# Patient Record
Sex: FEMALE
[De-identification: names, ages, dates, MRNs, and addresses within clinical notes are randomized per-mention and may not be internally consistent; named-entity substitution may affect disease eponyms.]

---

## 2023-08-30 PROBLEM — Z00.00 ENCOUNTER FOR PREVENTIVE HEALTH EXAMINATION: Status: ACTIVE | Noted: 2023-08-30

## 2023-10-02 PROBLEM — Z87.39 HISTORY OF OSTEOPOROSIS: Status: RESOLVED | Noted: 2023-10-02 | Resolved: 2023-10-02

## 2023-10-02 PROBLEM — C79.00: Status: ACTIVE | Noted: 2023-10-02

## 2023-10-02 PROBLEM — Z86.39 HISTORY OF HYPOTHYROIDISM: Status: RESOLVED | Noted: 2023-10-02 | Resolved: 2023-10-02

## 2023-10-02 RX ORDER — LEVOTHYROXINE SODIUM 0.05 MG/1
50 TABLET ORAL
Refills: 0 | Status: ACTIVE | COMMUNITY

## 2023-10-03 ENCOUNTER — APPOINTMENT (OUTPATIENT)
Dept: HEMATOLOGY ONCOLOGY | Facility: CLINIC | Age: 63
End: 2023-10-03
Payer: COMMERCIAL

## 2023-10-03 VITALS
SYSTOLIC BLOOD PRESSURE: 134 MMHG | DIASTOLIC BLOOD PRESSURE: 87 MMHG | BODY MASS INDEX: 20.24 KG/M2 | RESPIRATION RATE: 18 BRPM | OXYGEN SATURATION: 100 % | WEIGHT: 110 LBS | TEMPERATURE: 97.1 F | HEIGHT: 62 IN | HEART RATE: 88 BPM

## 2023-10-03 DIAGNOSIS — C79.00: ICD-10-CM

## 2023-10-03 DIAGNOSIS — Z86.39 PERSONAL HISTORY OF OTHER ENDOCRINE, NUTRITIONAL AND METABOLIC DISEASE: ICD-10-CM

## 2023-10-03 DIAGNOSIS — Z87.39 PERSONAL HISTORY OF OTHER DISEASES OF THE MUSCULOSKELETAL SYSTEM AND CONNECTIVE TISSUE: ICD-10-CM

## 2023-10-03 PROCEDURE — 99203 OFFICE O/P NEW LOW 30 MIN: CPT

## 2023-10-23 ENCOUNTER — TRANSCRIPTION ENCOUNTER (OUTPATIENT)
Age: 63
End: 2023-10-23

## 2023-12-12 ENCOUNTER — OUTPATIENT (OUTPATIENT)
Dept: OUTPATIENT SERVICES | Facility: HOSPITAL | Age: 63
LOS: 1 days | Discharge: HOME | End: 2023-12-12

## 2023-12-12 DIAGNOSIS — C69.32 MALIGNANT NEOPLASM OF LEFT CHOROID: ICD-10-CM

## 2023-12-12 DIAGNOSIS — C79.00 SECONDARY MALIGNANT NEOPLASM OF UNSPECIFIED KIDNEY AND RENAL PELVIS: ICD-10-CM

## 2023-12-18 ENCOUNTER — APPOINTMENT (OUTPATIENT)
Dept: MRI IMAGING | Facility: CLINIC | Age: 63
End: 2023-12-18
Payer: COMMERCIAL

## 2023-12-18 ENCOUNTER — APPOINTMENT (OUTPATIENT)
Dept: CT IMAGING | Facility: CLINIC | Age: 63
End: 2023-12-18
Payer: COMMERCIAL

## 2023-12-18 ENCOUNTER — OUTPATIENT (OUTPATIENT)
Dept: OUTPATIENT SERVICES | Facility: HOSPITAL | Age: 63
LOS: 1 days | End: 2023-12-18

## 2023-12-18 PROCEDURE — 71260 CT THORAX DX C+: CPT | Mod: 26

## 2023-12-18 PROCEDURE — 74183 MRI ABD W/O CNTR FLWD CNTR: CPT | Mod: 26

## 2023-12-18 PROCEDURE — 72197 MRI PELVIS W/O & W/DYE: CPT | Mod: 26

## 2023-12-21 ENCOUNTER — APPOINTMENT (OUTPATIENT)
Dept: HEMATOLOGY ONCOLOGY | Facility: CLINIC | Age: 63
End: 2023-12-21
Payer: COMMERCIAL

## 2023-12-21 PROCEDURE — 99214 OFFICE O/P EST MOD 30 MIN: CPT | Mod: 95

## 2023-12-21 NOTE — HISTORY OF PRESENT ILLNESS
[Home] : at home, [unfilled] , at the time of the visit. 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 2/9/23 renal cryoablation 8/10/23 renal microwave ablation  INTERVAL HISTORY: Karen Cortez is a 63 year old female with a history of choroidal melanoma of the left eye diagnosed in 2000 who underwent cryoablation on 02/09/2023 and then microablation for local recurrence of a solitary renal metastasis on 08/10/2023, and who returns for continued expectant observation.  Please see my prior note for her complex history.  She had been followed by Dr. Clark at [facility] but is now being managed by Dr. Forest Lutz at [facility].  Briefly, she was initially evaluated by her ophthalmologist in 2000 after developing a scratch in her left eye.  She was initially diagnosed with osteoma.  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  She believes that the initial tumor size was 14 mm and involved the optic nerve.  She was initially advised that she required enucleation given the size and location of the tumor, and sought multiple opinions, including Dr. Carrero at [facility] and Dr. Verdin at [facility].  She also saw Dr. Clark at [facility] and ultimately underwent proton beam therapy to the left eye in September 2000.  No biopsy was performed at that time (no cytogenetic of [facility] available).  An initial CT of the chest/abdomen/pelvis at that time was without evidence of metastatic disease.  She has since been monitored with at least annual eye exams with Dr. Clark since then.  She had also been under the care of Dr. Derek Grant for medical oncology surveillance.  She reports about q6 months CT scans of the chest/abdomen/pelvis for the first 5 years, followed by annual imaging until 2009.  She then established care at [facility] in June 2017.  MRI of the abdomen and pelvis on 9/28/17 demonstrated lesions within the right femoral neck, left femoral head, and right iliac crest, but follow up PETCT on 10/6/2017 did not show any FDG uptake.  Moreover, comparison of the PET/CT with prior outside CT scans from 2009 showed that the lesions were unchanged and appeared benign. She underwent an abdominal MRI on 12/4/21.  This study, when compared with a prior dated 8/5/20 demonstrated no significant changes in the liver, but the presence of a new 1 mm medial right upper pole renal lesion.  She saw a number of urologists at [facility] and elsewhere to explore management options.  She underwent repeat MRI abdomen on 11/28/22 which on outside report showed a newly visualized 1.1 cm lesion in the medial segment of the left lobe of the liver and interval increase in size of a mildly enhancing lesion in the posteromedial midpole of the right kidney.  Her scans were reviewed by [facility] radiologists who could not idenity the reported lesion within the liver. She underwent biopsy of the kdiney lesion on 1/6/23.  This demonstrated metastatic melanoma.  Molecular testing revealed GNAQ Q209L mutation.  After multidisclipinary discussion, she underwent cryoablation of this lesion on 2/9/23.  She underwent repeat imaging on 6/10/23 which demonstrated 1 cm area of enhancement adjacent to area of prior ablation, concerning for growing residual disease.  There was no other evidence of other metastases.  She underwent microwave ablation of this lesion on 8/10/23.  She has been on expectant observation since that time.  Clinically, the patient reports doing well.  She underwent a chest CT and an abdominal and pelvic MRI on 12/18/2023 which were negative for overt evidence of recurrent disease.

## 2023-12-21 NOTE — ASSESSMENT
[FreeTextEntry1] : Mrs. 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 Clark in September 2000 (no cytogenetic or GEP testing performed). She did well until she was diagnosed with biopsy proven solitary site of recurrence in the kidney on 01/06/2023. She underwent cryoablation of this lesion on 02/09/2023 and then microablation for local recurrence on 08/10/2023.  Based upon history, examination and review of imaging studies, she appears to be doing well without overt evidence of disease progression. At this time, we will continue her on close expectant observation. We will see her back in 2 to 3 months with full surveillance imaging studies. She knows to contact us should she have any questions or concerns in the interim.

## 2024-02-20 ENCOUNTER — RESULT REVIEW (OUTPATIENT)
Age: 64
End: 2024-02-20

## 2024-03-04 ENCOUNTER — APPOINTMENT (OUTPATIENT)
Dept: MRI IMAGING | Facility: CLINIC | Age: 64
End: 2024-03-04
Payer: COMMERCIAL

## 2024-03-04 ENCOUNTER — APPOINTMENT (OUTPATIENT)
Dept: CT IMAGING | Facility: CLINIC | Age: 64
End: 2024-03-04
Payer: COMMERCIAL

## 2024-03-04 ENCOUNTER — OUTPATIENT (OUTPATIENT)
Dept: OUTPATIENT SERVICES | Facility: HOSPITAL | Age: 64
LOS: 1 days | End: 2024-03-04

## 2024-03-04 PROCEDURE — 72197 MRI PELVIS W/O & W/DYE: CPT | Mod: 26

## 2024-03-04 PROCEDURE — 71260 CT THORAX DX C+: CPT | Mod: 26

## 2024-03-04 PROCEDURE — 74183 MRI ABD W/O CNTR FLWD CNTR: CPT | Mod: 26

## 2024-03-19 ENCOUNTER — APPOINTMENT (OUTPATIENT)
Dept: HEMATOLOGY ONCOLOGY | Facility: CLINIC | Age: 64
End: 2024-03-19
Payer: COMMERCIAL

## 2024-03-19 PROCEDURE — 99214 OFFICE O/P EST MOD 30 MIN: CPT

## 2024-03-19 PROCEDURE — G2211 COMPLEX E/M VISIT ADD ON: CPT

## 2024-03-19 NOTE — HISTORY OF PRESENT ILLNESS
[Home] : at home, [unfilled] , at the time of the visit. 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 2/9/23 renal cryoablation 8/10/23 renal microwave ablation  INTERVAL HISTORY: Karen Cortez is a 64 year old female with a history of choroidal melanoma of the left eye diagnosed in 2000 who underwent cryoablation on 02/09/2023 and then microablation for local recurrence of a solitary renal metastasis on 08/10/2023, and who returns for continued expectant observation.  Please see my prior note for her complex history.  She had been followed by Dr. Clark at [facility] but is now being managed by Dr. Forest Lutz at [facility].  Briefly, she was initially evaluated by her ophthalmologist in 2000 after developing a scratch in her left eye.  She was initially diagnosed with osteoma.  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  She believes that the initial tumor size was 14 mm and involved the optic nerve.  She was initially advised that she required enucleation given the size and location of the tumor, and sought multiple opinions, including Dr. Carrero at [facility] and Dr. Verdin at [facility].  She also saw Dr. Clark at [facility] and ultimately underwent proton beam therapy to the left eye in September 2000.  No biopsy was performed at that time (no cytogenetic of [facility] available).  An initial CT of the chest/abdomen/pelvis at that time was without evidence of metastatic disease.  She has since been monitored with at least annual eye exams with Dr. Clark since then.  She had also been under the care of Dr. Derek Grant for medical oncology surveillance.  She reports about q6 months CT scans of the chest/abdomen/pelvis for the first 5 years, followed by annual imaging until 2009.  She then established care at [facility] in June 2017.  MRI of the abdomen and pelvis on 9/28/17 demonstrated lesions within the right femoral neck, left femoral head, and right iliac crest, but follow up PETCT on 10/6/2017 did not show any FDG uptake.  Moreover, comparison of the PET/CT with prior outside CT scans from 2009 showed that the lesions were unchanged and appeared benign. She underwent an abdominal MRI on 12/4/21.  This study, when compared with a prior dated 8/5/20 demonstrated no significant changes in the liver, but the presence of a new 1 mm medial right upper pole renal lesion.  She saw a number of urologists at [facility] and elsewhere to explore management options.  She underwent repeat MRI abdomen on 11/28/22 which on outside report showed a newly visualized 1.1 cm lesion in the medial segment of the left lobe of the liver and interval increase in size of a mildly enhancing lesion in the posteromedial midpole of the right kidney.  Her scans were reviewed by [facility] radiologists who could not idenity the reported lesion within the liver. She underwent biopsy of the kidney lesion on 1/6/23.  This demonstrated metastatic melanoma.  Molecular testing revealed GNAQ Q209L mutation.  After multidisclipinary discussion, she underwent cryoablation of this lesion on 2/9/23.  She underwent repeat imaging on 6/10/23 which demonstrated 1 cm area of enhancement adjacent to area of prior ablation, concerning for growing residual disease.  There was no other evidence of other metastases.  She underwent microwave ablation of this lesion on 8/10/23.  She has been on expectant observation since that time.  Clinically, the patient reports doing well.  She is enjoying working at [employer].  She underwent a chest CT and an abdominal and pelvic MRI on 03/04/2024 which were negative for overt evidence of recurrent disease.

## 2024-03-19 NOTE — ASSESSMENT
[FreeTextEntry1] : Mrs. 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 Clark in September 2000 (no cytogenetic or GEP testing performed). She did well until she was diagnosed with biopsy proven solitary site of recurrence in the kidney on 01/06/2023. She underwent cryoablation of this lesion on 02/09/2023 and then microablation for local recurrence on 08/10/2023.  Based upon history, examination and review of imaging studies, she appears to be doing well without overt evidence of disease progression. At this time, we will continue her on close expectant observation. We will see her back in 3 months with full surveillance imaging studies. She knows to contact us should she have any questions or concerns in the interim.

## 2024-03-19 NOTE — REASON FOR VISIT
[Home] : at home, [unfilled] , at the time of the visit. [Medical Office: ([facility])___] : at the medical office located in  [Patient] : the patient [Follow-Up Visit] : a follow-up

## 2024-06-03 ENCOUNTER — APPOINTMENT (OUTPATIENT)
Dept: MRI IMAGING | Facility: CLINIC | Age: 64
End: 2024-06-03
Payer: COMMERCIAL

## 2024-06-03 ENCOUNTER — APPOINTMENT (OUTPATIENT)
Dept: CT IMAGING | Facility: CLINIC | Age: 64
End: 2024-06-03
Payer: COMMERCIAL

## 2024-06-03 ENCOUNTER — OUTPATIENT (OUTPATIENT)
Dept: OUTPATIENT SERVICES | Facility: HOSPITAL | Age: 64
LOS: 1 days | End: 2024-06-03

## 2024-06-03 PROCEDURE — 71260 CT THORAX DX C+: CPT | Mod: 26

## 2024-06-03 PROCEDURE — 74183 MRI ABD W/O CNTR FLWD CNTR: CPT | Mod: 26

## 2024-06-10 PROBLEM — C69.32: Status: ACTIVE | Noted: 2023-10-02

## 2024-06-10 NOTE — REASON FOR VISIT
[Follow-Up Visit] : a follow-up visit for [Home] : at home, [unfilled] , at the time of the visit. [Medical Office: ([facility])___] : at the medical office located in  [Patient] : the patient

## 2024-06-10 NOTE — REASON FOR VISIT
[Follow-Up Visit] : a follow-up visit for [Home] : at home, [unfilled] , at the time of the visit. [Medical Office: ([facility])___] : at the medical office located in  [Patient] : the patient

## 2024-06-11 ENCOUNTER — APPOINTMENT (OUTPATIENT)
Dept: HEMATOLOGY ONCOLOGY | Facility: CLINIC | Age: 64
End: 2024-06-11
Payer: COMMERCIAL

## 2024-06-11 DIAGNOSIS — C69.32 MALIGNANT NEOPLASM OF LEFT CHOROID: ICD-10-CM

## 2024-06-11 PROCEDURE — 99214 OFFICE O/P EST MOD 30 MIN: CPT

## 2024-06-11 PROCEDURE — G2211 COMPLEX E/M VISIT ADD ON: CPT | Mod: NC,1L

## 2024-06-11 NOTE — ASSESSMENT
[FreeTextEntry1] : Mrs. Cortez is a 64 -year -old female with a history of a uveal melanoma arising from the left eye diagnosed in 2000, status post proton beam therapy with Dr. Clark in September 2000 (no cytogenetic or GEP testing performed). She did well until she was diagnosed with biopsy proven solitary site of recurrence in the kidney on 01/06/2023. She underwent cryoablation of this lesion on 02/09/2023 and then microablation for local recurrence on 08/10/2023.  Based upon history, examination and review of imaging studies, she appears to be doing well without overt evidence of disease progression. At this time, we will continue her on close expectant observation.  We will see her back in 3 months with full surveillance imaging studies. She knows to contact us should she have any questions or concerns in the interim.

## 2024-06-11 NOTE — HISTORY OF PRESENT ILLNESS
[Home] : at home, [unfilled] , at the time of the visit. [Medical Office: ([facility])___] : at the medical office located in  [Verbal consent obtained from patient] : the patient, [unfilled] [de-identified] : Patient is here today for a follow up visit. Since the last visit, the following has occurred: - 6/3/2024: CT Chest - No suspicious pulmonary finding.  - 6/3/2024: MR Abdomen - No evidence of metastatic disease within the abdomen.

## 2024-06-11 NOTE — PHYSICAL EXAM
[Fully active, able to carry on all pre-disease performance without restriction] : Status 0 - Fully active, able to carry on all pre-disease performance without restriction [Normal] : well developed, well nourished, in no acute distress [de-identified] : No increased work of breathing. [de-identified] : Alert and Oriented x 3

## 2024-06-11 NOTE — HISTORY OF PRESENT ILLNESS
[Home] : at home, [unfilled] , at the time of the visit. [Medical Office: ([facility])___] : at the medical office located in  [Verbal consent obtained from patient] : the patient, [unfilled] [de-identified] : Patient is here today for a follow up visit. Since the last visit, the following has occurred: - 6/3/2024: CT Chest - No suspicious pulmonary finding.  - 6/3/2024: MR Abdomen - No evidence of metastatic disease within the abdomen.

## 2024-06-11 NOTE — PHYSICAL EXAM
[Fully active, able to carry on all pre-disease performance without restriction] : Status 0 - Fully active, able to carry on all pre-disease performance without restriction [Normal] : well developed, well nourished, in no acute distress [de-identified] : No increased work of breathing. [de-identified] : Alert and Oriented x 3

## 2024-09-16 ENCOUNTER — APPOINTMENT (OUTPATIENT)
Dept: MRI IMAGING | Facility: CLINIC | Age: 64
End: 2024-09-16
Payer: COMMERCIAL

## 2024-09-16 ENCOUNTER — OUTPATIENT (OUTPATIENT)
Dept: OUTPATIENT SERVICES | Facility: HOSPITAL | Age: 64
LOS: 1 days | End: 2024-09-16

## 2024-09-16 ENCOUNTER — APPOINTMENT (OUTPATIENT)
Dept: CT IMAGING | Facility: CLINIC | Age: 64
End: 2024-09-16
Payer: COMMERCIAL

## 2024-09-16 PROCEDURE — 71260 CT THORAX DX C+: CPT | Mod: 26

## 2024-09-16 PROCEDURE — 74183 MRI ABD W/O CNTR FLWD CNTR: CPT | Mod: 26

## 2024-09-16 PROCEDURE — 72197 MRI PELVIS W/O & W/DYE: CPT | Mod: 26

## 2024-09-17 ENCOUNTER — NON-APPOINTMENT (OUTPATIENT)
Age: 64
End: 2024-09-17

## 2024-09-18 ENCOUNTER — NON-APPOINTMENT (OUTPATIENT)
Age: 64
End: 2024-09-18

## 2024-09-23 NOTE — REASON FOR VISIT
[Follow-Up Visit] : a follow-up visit for [Home] : at home, [unfilled] , at the time of the visit. [Medical Office: ([facility])___] : at the medical office located in  [Patient] : the patient

## 2024-09-23 NOTE — REASON FOR VISIT
[Follow-Up Visit] : a follow-up visit for [Home] : at home, [unfilled] , at the time of the visit. [Medical Office: ([facility])___] : at the medical office located in  [Patient] : the patient

## 2024-09-24 ENCOUNTER — APPOINTMENT (OUTPATIENT)
Dept: HEMATOLOGY ONCOLOGY | Facility: CLINIC | Age: 64
End: 2024-09-24
Payer: COMMERCIAL

## 2024-09-24 ENCOUNTER — NON-APPOINTMENT (OUTPATIENT)
Age: 64
End: 2024-09-24

## 2024-09-24 DIAGNOSIS — C69.32 MALIGNANT NEOPLASM OF LEFT CHOROID: ICD-10-CM

## 2024-09-24 PROCEDURE — 99214 OFFICE O/P EST MOD 30 MIN: CPT | Mod: 95

## 2024-09-24 PROCEDURE — G2211 COMPLEX E/M VISIT ADD ON: CPT | Mod: NC

## 2024-09-24 NOTE — ASSESSMENT
[FreeTextEntry1] : Mrs. Cortez is a 64 -year -old female with a history of a uveal melanoma arising from the left eye diagnosed in 2000, status post proton beam therapy with Dr. Clark in September 2000 (no cytogenetic or GEP testing performed). She did well until she was diagnosed with biopsy proven solitary site of recurrence in the kidney on 01/06/2023. She underwent cryoablation of this lesion on 02/09/2023 and then microablation for local recurrence on 08/10/2023.  Based upon history, examination and review of imaging studies, she appears to be doing well without overt evidence of disease progression. At this time, we will continue her on close expectant observation.  We will see her back in 4 months with full surveillance imaging studies. She knows to contact us should she have any questions or concerns in the interim. She is planning to travel to [facility] as well as [place] with a richelle.

## 2024-09-24 NOTE — ASSESSMENT
[FreeTextEntry1] : Mrs. Cortez is a 64 -year -old female with a history of a uveal melanoma arising from the left eye diagnosed in 2000, status post proton beam therapy with Dr. Clark in September 2000 (no cytogenetic or GEP testing performed). She did well until she was diagnosed with biopsy proven solitary site of recurrence in the kidney on 01/06/2023. She underwent cryoablation of this lesion on 02/09/2023 and then microablation for local recurrence on 08/10/2023.  Based upon history, examination and review of imaging studies, she appears to be doing well without overt evidence of disease progression. At this time, we will continue her on close expectant observation.  We will see her back in 4 months with full surveillance imaging studies. She knows to contact us should she have any questions or concerns in the interim. She is planning to travel to [facility] as well as [place] with a richelle.

## 2024-09-24 NOTE — HISTORY OF PRESENT ILLNESS
[Home] : at home, [unfilled] , at the time of the visit. 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 2/9/23 renal cryoablation 8/10/23 renal microwave ablation  INTERVAL HISTORY: Karen Cortez is a 64 year old female with a history of choroidal melanoma of the left eye diagnosed in 2000 who underwent cryoablation on 02/09/2023 and then microablation for local recurrence of a solitary renal metastasis on 08/10/2023, and who returns for continued expectant observation.  Please see my prior note for her complex history.  She had been followed by Dr. Clark at [facility] but is now being managed by Dr. Forest Lutz at [facility].  Briefly, she was initially evaluated by her ophthalmologist in 2000 after developing a scratch in her left eye.  She was initially diagnosed with osteoma.  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  She believes that the initial tumor size was 14 mm and involved the optic nerve.  She was initially advised that she required enucleation given the size and location of the tumor, and sought multiple opinions, including Dr. Carrero at [facility] and Dr. Verdin at [facility].  She also saw Dr. Clark at [facility] and ultimately underwent proton beam therapy to the left eye in September 2000.  No biopsy was performed at that time (no cytogenetic of [facility] available).  An initial CT of the chest/abdomen/pelvis at that time was without evidence of metastatic disease.  She has since been monitored with at least annual eye exams with Dr. Clark since then.  She had also been under the care of Dr. Derek Grant for medical oncology surveillance.  She reports about q6 months CT scans of the chest/abdomen/pelvis for the first 5 years, followed by annual imaging until 2009.  She then established care at [facility] in June 2017.  MRI of the abdomen and pelvis on 9/28/17 demonstrated lesions within the right femoral neck, left femoral head, and right iliac crest, but follow up PETCT on 10/6/2017 did not show any FDG uptake.  Moreover, comparison of the PET/CT with prior outside CT scans from 2009 showed that the lesions were unchanged and appeared benign. She underwent an abdominal MRI on 12/4/21.  This study, when compared with a prior dated 8/5/20 demonstrated no significant changes in the liver, but the presence of a new 1 mm medial right upper pole renal lesion.  She saw a number of urologists at [facility] and elsewhere to explore management options.  She underwent repeat MRI abdomen on 11/28/22 which on outside report showed a newly visualized 1.1 cm lesion in the medial segment of the left lobe of the liver and interval increase in size of a mildly enhancing lesion in the posteromedial midpole of the right kidney.  Her scans were reviewed by [facility] radiologists who could not idenity the reported lesion within the liver. She underwent biopsy of the kidney lesion on 1/6/23.  This demonstrated metastatic melanoma.  Molecular testing revealed GNAQ Q209L mutation.  After multidisclipinary discussion, she underwent cryoablation of this lesion on 2/9/23.  She underwent repeat imaging on 6/10/23 which demonstrated 1 cm area of enhancement adjacent to area of prior ablation, concerning for growing residual disease.  There was no other evidence of other metastases.  She underwent microwave ablation of this lesion on 8/10/23.  She has been on expectant observation since that time.  Clinically, the patient reports doing well.  She is enjoying working at [employer].  She underwent a chest CT and an abdominal and pelvic MRI on 03/04/2024 which were negative for overt evidence of recurrent disease.  Patient is here for a follow up visit. She is reporting that she will be having cataract surgery in July at [facility].  CT Chest: No suspicious pulmonary finding. MR Abdomen: No evidence of metastatic disease within the abdomen. Post-ablation changes of the upper pole of the right kidney without evidence of local recurrence. [de-identified] : Patient is here for a follow up visit; last seen in June 2024.  Patient underwent recent imaging: - CT Chest: Persistent trace pericardial effusion. No suspicious pulmonary finding.  - MR Abdomen/Pelvis: Stable exam. Stable post ablation/posttreatment site upper pole right kidney, without complication or local disease recurrence. No evidence of hepatic metastasis.

## 2024-09-24 NOTE — BEGINNING OF VISIT
[0] : 2) Feeling down, depressed, or hopeless: Not at all (0) [PHQ-2 Negative] : PHQ-2 Negative [Pain Scale: ___] : On a scale of 1-10, today the patient's pain is a(n) [unfilled]. [Never] : Never [Reviewed, no changes] : Reviewed, no changes [Diarrhea Character] : Diarrhea: Grade 0

## 2024-09-24 NOTE — PHYSICAL EXAM
[Fully active, able to carry on all pre-disease performance without restriction] : Status 0 - Fully active, able to carry on all pre-disease performance without restriction [Normal] : well developed, well nourished, in no acute distress [de-identified] : No increased work of breathing. [de-identified] : Alert and Oriented x 3

## 2024-09-24 NOTE — HISTORY OF PRESENT ILLNESS
[Home] : at home, [unfilled] , at the time of the visit. 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 2/9/23 renal cryoablation 8/10/23 renal microwave ablation  INTERVAL HISTORY: Karen Cortez is a 64 year old female with a history of choroidal melanoma of the left eye diagnosed in 2000 who underwent cryoablation on 02/09/2023 and then microablation for local recurrence of a solitary renal metastasis on 08/10/2023, and who returns for continued expectant observation.  Please see my prior note for her complex history.  She had been followed by Dr. Clark at [facility] but is now being managed by Dr. Forest Lutz at [facility].  Briefly, she was initially evaluated by her ophthalmologist in 2000 after developing a scratch in her left eye.  She was initially diagnosed with osteoma.  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  She believes that the initial tumor size was 14 mm and involved the optic nerve.  She was initially advised that she required enucleation given the size and location of the tumor, and sought multiple opinions, including Dr. Carrero at [facility] and Dr. Verdin at [facility].  She also saw Dr. Clark at [facility] and ultimately underwent proton beam therapy to the left eye in September 2000.  No biopsy was performed at that time (no cytogenetic of [facility] available).  An initial CT of the chest/abdomen/pelvis at that time was without evidence of metastatic disease.  She has since been monitored with at least annual eye exams with Dr. Clark since then.  She had also been under the care of Dr. Derek Grant for medical oncology surveillance.  She reports about q6 months CT scans of the chest/abdomen/pelvis for the first 5 years, followed by annual imaging until 2009.  [facility].  MRI of the abdomen and pelvis on 9/28/17 demonstrated lesions within the right femoral neck, left femoral head, and right iliac crest, but follow up PETCT on 10/6/2017 did not show any FDG uptake.  Moreover, comparison of the PET/CT with prior outside CT scans from 2009 showed that the lesions were unchanged and appeared benign. She underwent an abdominal MRI on 12/4/21.  This study, when compared with a prior dated 8/5/20 demonstrated no significant changes in the liver, but the presence of a new 1 mm medial right upper pole renal lesion.  She saw a number of urologists at [facility] and elsewhere to explore management options.  She underwent repeat MRI abdomen on 11/28/22 which on outside report showed a newly visualized 1.1 cm lesion in the medial segment of the left lobe of the liver and interval increase in size of a mildly enhancing lesion in the posteromedial midpole of the right kidney.  Her scans were reviewed by [facility] radiologists who could not idenity the reported lesion within the liver. She underwent biopsy of the kidney lesion on 1/6/23.  This demonstrated metastatic melanoma.  Molecular testing revealed GNAQ Q209L mutation.  After multidisclipinary discussion, she underwent cryoablation of this lesion on 2/9/23.  She underwent repeat imaging on 6/10/23 which demonstrated 1 cm area of enhancement adjacent to area of prior ablation, concerning for growing residual disease.  There was no other evidence of other metastases.  She underwent microwave ablation of this lesion on 8/10/23.  She has been on expectant observation since that time.  Clinically, the patient reports doing well.  She is enjoying working at [employer]  She underwent a chest CT and an abdominal and pelvic MRI on 03/04/2024 which were negative for overt evidence of recurrent disease.  Patient is here for a follow up visit. She is reporting that she will be having cataract surgery in July at [facility].  CT Chest: No suspicious pulmonary finding. MR Abdomen: No evidence of metastatic disease within the abdomen. Post-ablation changes of the upper pole of the right kidney without evidence of local recurrence. [de-identified] : Patient is here for a follow up visit; last seen in June 2024.  Patient underwent recent imaging: - CT Chest: Persistent trace pericardial effusion. No suspicious pulmonary finding.  - MR Abdomen/Pelvis: Stable exam. Stable post ablation/posttreatment site upper pole right kidney, without complication or local disease recurrence. No evidence of hepatic metastasis.

## 2024-09-24 NOTE — PHYSICAL EXAM
[Fully active, able to carry on all pre-disease performance without restriction] : Status 0 - Fully active, able to carry on all pre-disease performance without restriction [Normal] : well developed, well nourished, in no acute distress [de-identified] : No increased work of breathing. [de-identified] : Alert and Oriented x 3

## 2025-01-13 ENCOUNTER — OUTPATIENT (OUTPATIENT)
Dept: OUTPATIENT SERVICES | Facility: HOSPITAL | Age: 65
LOS: 1 days | End: 2025-01-13

## 2025-01-13 ENCOUNTER — APPOINTMENT (OUTPATIENT)
Dept: MRI IMAGING | Facility: CLINIC | Age: 65
End: 2025-01-13
Payer: COMMERCIAL

## 2025-01-13 ENCOUNTER — APPOINTMENT (OUTPATIENT)
Dept: CT IMAGING | Facility: CLINIC | Age: 65
End: 2025-01-13
Payer: COMMERCIAL

## 2025-01-13 PROCEDURE — 74183 MRI ABD W/O CNTR FLWD CNTR: CPT | Mod: 26

## 2025-01-13 PROCEDURE — 71260 CT THORAX DX C+: CPT | Mod: 26

## 2025-01-13 PROCEDURE — 72197 MRI PELVIS W/O & W/DYE: CPT | Mod: 26

## 2025-01-14 ENCOUNTER — NON-APPOINTMENT (OUTPATIENT)
Age: 65
End: 2025-01-14

## 2025-01-15 ENCOUNTER — NON-APPOINTMENT (OUTPATIENT)
Age: 65
End: 2025-01-15

## 2025-01-21 ENCOUNTER — APPOINTMENT (OUTPATIENT)
Dept: HEMATOLOGY ONCOLOGY | Facility: CLINIC | Age: 65
End: 2025-01-21
Payer: COMMERCIAL

## 2025-01-21 DIAGNOSIS — C69.32 MALIGNANT NEOPLASM OF LEFT CHOROID: ICD-10-CM

## 2025-01-21 PROCEDURE — 99214 OFFICE O/P EST MOD 30 MIN: CPT | Mod: 95

## 2025-01-21 PROCEDURE — 98006 SYNCH AUDIO-VIDEO EST MOD 30: CPT | Mod: 95

## 2025-01-21 PROCEDURE — G2211 COMPLEX E/M VISIT ADD ON: CPT | Mod: NC

## 2025-05-13 ENCOUNTER — OUTPATIENT (OUTPATIENT)
Dept: OUTPATIENT SERVICES | Facility: HOSPITAL | Age: 65
LOS: 1 days | End: 2025-05-13

## 2025-05-13 ENCOUNTER — APPOINTMENT (OUTPATIENT)
Dept: MRI IMAGING | Facility: CLINIC | Age: 65
End: 2025-05-13
Payer: COMMERCIAL

## 2025-05-13 ENCOUNTER — APPOINTMENT (OUTPATIENT)
Dept: CT IMAGING | Facility: CLINIC | Age: 65
End: 2025-05-13
Payer: COMMERCIAL

## 2025-05-13 PROCEDURE — 72197 MRI PELVIS W/O & W/DYE: CPT | Mod: 26

## 2025-05-13 PROCEDURE — 74183 MRI ABD W/O CNTR FLWD CNTR: CPT | Mod: 26

## 2025-05-13 PROCEDURE — 71260 CT THORAX DX C+: CPT | Mod: 26

## 2025-05-15 ENCOUNTER — NON-APPOINTMENT (OUTPATIENT)
Age: 65
End: 2025-05-15

## 2025-05-20 ENCOUNTER — APPOINTMENT (OUTPATIENT)
Dept: HEMATOLOGY ONCOLOGY | Facility: CLINIC | Age: 65
End: 2025-05-20
Payer: COMMERCIAL

## 2025-05-20 PROCEDURE — G2211 COMPLEX E/M VISIT ADD ON: CPT | Mod: NC,95

## 2025-05-20 PROCEDURE — 99214 OFFICE O/P EST MOD 30 MIN: CPT | Mod: 95

## 2025-09-03 ENCOUNTER — OUTPATIENT (OUTPATIENT)
Dept: OUTPATIENT SERVICES | Facility: HOSPITAL | Age: 65
LOS: 1 days | End: 2025-09-03

## 2025-09-03 ENCOUNTER — APPOINTMENT (OUTPATIENT)
Dept: CT IMAGING | Facility: CLINIC | Age: 65
End: 2025-09-03
Payer: COMMERCIAL

## 2025-09-03 ENCOUNTER — APPOINTMENT (OUTPATIENT)
Dept: MRI IMAGING | Facility: CLINIC | Age: 65
End: 2025-09-03

## 2025-09-03 PROCEDURE — 71250 CT THORAX DX C-: CPT | Mod: 26

## 2025-09-19 ENCOUNTER — APPOINTMENT (OUTPATIENT)
Dept: MRI IMAGING | Facility: CLINIC | Age: 65
End: 2025-09-19
Payer: COMMERCIAL

## 2025-09-19 PROCEDURE — 74183 MRI ABD W/O CNTR FLWD CNTR: CPT | Mod: 26
